# Patient Record
Sex: MALE | Race: OTHER | NOT HISPANIC OR LATINO | Employment: FULL TIME | URBAN - METROPOLITAN AREA
[De-identification: names, ages, dates, MRNs, and addresses within clinical notes are randomized per-mention and may not be internally consistent; named-entity substitution may affect disease eponyms.]

---

## 2024-10-15 ENCOUNTER — APPOINTMENT (OUTPATIENT)
Dept: URGENT CARE | Age: 39
End: 2024-10-15

## 2024-10-17 ENCOUNTER — OFFICE VISIT (OUTPATIENT)
Dept: URGENT CARE | Age: 39
End: 2024-10-17
Payer: COMMERCIAL

## 2024-10-17 VITALS
RESPIRATION RATE: 20 BRPM | OXYGEN SATURATION: 98 % | TEMPERATURE: 98.2 F | SYSTOLIC BLOOD PRESSURE: 112 MMHG | HEART RATE: 67 BPM | DIASTOLIC BLOOD PRESSURE: 75 MMHG

## 2024-10-17 DIAGNOSIS — J01.90 ACUTE SINUSITIS, RECURRENCE NOT SPECIFIED, UNSPECIFIED LOCATION: Primary | ICD-10-CM

## 2024-10-17 DIAGNOSIS — R09.81 CONGESTION OF NASAL SINUS: ICD-10-CM

## 2024-10-17 DIAGNOSIS — R05.1 ACUTE COUGH: ICD-10-CM

## 2024-10-17 LAB
POC RAPID INFLUENZA A: NEGATIVE
POC RAPID INFLUENZA B: NEGATIVE
POC SARS-COV-2 AG BINAX: NORMAL

## 2024-10-17 RX ORDER — BROMPHENIRAMINE MALEATE, PSEUDOEPHEDRINE HYDROCHLORIDE, AND DEXTROMETHORPHAN HYDROBROMIDE 2; 30; 10 MG/5ML; MG/5ML; MG/5ML
10 SYRUP ORAL 4 TIMES DAILY PRN
Qty: 120 ML | Refills: 0 | Status: SHIPPED | OUTPATIENT
Start: 2024-10-17 | End: 2024-10-27

## 2024-10-17 RX ORDER — AMOXICILLIN AND CLAVULANATE POTASSIUM 875; 125 MG/1; MG/1
1 TABLET, FILM COATED ORAL 2 TIMES DAILY
Qty: 20 TABLET | Refills: 0 | Status: SHIPPED | OUTPATIENT
Start: 2024-10-17 | End: 2024-10-27

## 2024-10-17 ASSESSMENT — ENCOUNTER SYMPTOMS
RHINORRHEA: 1
FEVER: 0
MYALGIAS: 0
HEADACHES: 0
SORE THROAT: 1
SWEATS: 0
SHORTNESS OF BREATH: 0
CHILLS: 0
HEMOPTYSIS: 0
WHEEZING: 0
COUGH: 1
WEIGHT LOSS: 0
HEARTBURN: 0

## 2024-10-17 NOTE — PROGRESS NOTES
Subjective   Patient ID: Terence Walters is a 39 y.o. male. They present today with a chief complaint of Cough and Nasal Congestion (Sinus and chest congestion with cough).    History of Present Illness    History provided by:  Patient   used: No    Cough  This is a new problem. Episode onset: 5 days. The problem has been gradually worsening. The problem occurs hourly. The cough is Productive of purulent sputum. Associated symptoms include nasal congestion, postnasal drip, rhinorrhea and a sore throat. Pertinent negatives include no chest pain, chills, ear congestion, ear pain, fever, headaches, heartburn, hemoptysis, myalgias, rash, shortness of breath, sweats, weight loss or wheezing. Associated symptoms comments: Sore throat rated 7/10. Nothing aggravates the symptoms. He has tried nothing for the symptoms. The treatment provided no relief.       Past Medical History  Allergies as of 10/17/2024    (No Known Allergies)       (Not in a hospital admission)       No past medical history on file.    Past Surgical History:   Procedure Laterality Date    OTHER SURGICAL HISTORY  07/26/2022    No history of surgery            Review of Systems  Review of Systems   Constitutional:  Negative for chills, fever and weight loss.   HENT:  Positive for postnasal drip, rhinorrhea and sore throat. Negative for ear pain.    Respiratory:  Positive for cough. Negative for hemoptysis, shortness of breath and wheezing.    Cardiovascular:  Negative for chest pain.   Gastrointestinal:  Negative for heartburn.   Musculoskeletal:  Negative for myalgias.   Skin:  Negative for rash.   Neurological:  Negative for headaches.     Objective    Vitals:    10/17/24 1115   BP: 112/75   Pulse: 67   Resp: 20   Temp: 36.8 °C (98.2 °F)   SpO2: 98%     No LMP for male patient.    Physical Exam    Procedures    Point of Care Test & Imaging Results from this visit  Results for orders placed or performed in visit on 10/17/24   POCT  Covid-19 Rapid Antigen   Result Value Ref Range    POC MAY-COV-2 AG  Presumptive negative test for SARS-CoV-2 (no antigen detected)     Presumptive negative test for SARS-CoV-2 (no antigen detected)   POCT Influenza A/B manually resulted   Result Value Ref Range    POC Rapid Influenza A Negative Negative    POC Rapid Influenza B Negative Negative      No results found.    Diagnostic study results (if any) were reviewed by RUFINO Chi.    Assessment/Plan   Allergies, medications, history, and pertinent labs/EKGs/Imaging reviewed by RUFINO Chi.     Medical Decision Making  Take the antibiotic with food.  Eat yogurt or take probiotic once a day.  Symptoms should improve in 2 to 3 days.   Wash your hands often, especially after coughing or touching your nose or mouth.  Use disposable tissues instead of handkerchiefs.  Increase fluid intake and rest as needed.  Take Tylenol and/or ibuprofen as needed for aches and pain and/or fever.  Return or follow-up with primary care provider if symptoms did not improve.  Call 911 or go to the nearest emergency room if symptoms became severe such as fever of 102.5 degrees Fahrenheit or 39.2 degrees Celsius, severe pain, shortness of breath, chest tightness.   Patient verbalized understanding of the instructions and left in stable condition.      Orders and Diagnoses  Diagnoses and all orders for this visit:  Acute sinusitis, recurrence not specified, unspecified location  -     POCT Covid-19 Rapid Antigen  -     POCT Influenza A/B manually resulted  -     amoxicillin-pot clavulanate (Augmentin) 875-125 mg tablet; Take 1 tablet by mouth 2 times a day for 10 days.  -     brompheniramine-pseudoeph-DM 2-30-10 mg/5 mL syrup; Take 10 mL by mouth 4 times a day as needed for allergies, congestion or cough for up to 10 days.  Congestion of nasal sinus  -     POCT Covid-19 Rapid Antigen  -     POCT Influenza A/B manually resulted  -     amoxicillin-pot clavulanate  (Augmentin) 875-125 mg tablet; Take 1 tablet by mouth 2 times a day for 10 days.  -     brompheniramine-pseudoeph-DM 2-30-10 mg/5 mL syrup; Take 10 mL by mouth 4 times a day as needed for allergies, congestion or cough for up to 10 days.  Acute cough  -     POCT Covid-19 Rapid Antigen  -     POCT Influenza A/B manually resulted  -     amoxicillin-pot clavulanate (Augmentin) 875-125 mg tablet; Take 1 tablet by mouth 2 times a day for 10 days.  -     brompheniramine-pseudoeph-DM 2-30-10 mg/5 mL syrup; Take 10 mL by mouth 4 times a day as needed for allergies, congestion or cough for up to 10 days.      Medical Admin Record      Patient disposition: Home    Electronically signed by RUFINO Chi  12:11 PM

## 2024-11-21 ENCOUNTER — OFFICE VISIT (OUTPATIENT)
Dept: URGENT CARE | Age: 39
End: 2024-11-21
Payer: COMMERCIAL

## 2024-11-21 VITALS
DIASTOLIC BLOOD PRESSURE: 71 MMHG | RESPIRATION RATE: 18 BRPM | OXYGEN SATURATION: 98 % | HEART RATE: 60 BPM | TEMPERATURE: 97 F | SYSTOLIC BLOOD PRESSURE: 118 MMHG

## 2024-11-21 DIAGNOSIS — H61.22 IMPACTED CERUMEN OF LEFT EAR: Primary | ICD-10-CM

## 2024-11-21 ASSESSMENT — ENCOUNTER SYMPTOMS
CARDIOVASCULAR NEGATIVE: 1
RESPIRATORY NEGATIVE: 1
CONSTITUTIONAL NEGATIVE: 1

## 2024-11-21 NOTE — PROGRESS NOTES
Subjective   Patient ID: Terence Walters is a 39 y.o. male. They present today with a chief complaint of Earache (BL ear complaint).    History of Present Illness  39-year-old male presents to clinic today with complaints of bilateral ear itching.  Patient states that this originally started with the left 1 about 4 to 5 days ago.  It is now somewhat the right.  He denies true pain.  He states that there was a smell to the left ear.  Denies discharge.  Denies trauma to the ear.      Earache         Past Medical History  Allergies as of 11/21/2024    (No Known Allergies)       (Not in a hospital admission)       History reviewed. No pertinent past medical history.    Past Surgical History:   Procedure Laterality Date    OTHER SURGICAL HISTORY  07/26/2022    No history of surgery            Review of Systems  Review of Systems   Constitutional: Negative.    HENT:  Positive for ear pain.    Respiratory: Negative.     Cardiovascular: Negative.                                   Objective    Vitals:    11/21/24 1026   BP: 118/71   Pulse: 60   Resp: 18   Temp: 36.1 °C (97 °F)   TempSrc: Temporal   SpO2: 98%     No LMP for male patient.    Physical Exam  Constitutional:       Appearance: Normal appearance.   HENT:      Right Ear: Tympanic membrane, ear canal and external ear normal.      Left Ear: Tympanic membrane and external ear normal. There is impacted cerumen.   Neurological:      Mental Status: He is alert.         Ear Cerumen Removal    Date/Time: 11/21/2024 11:00 AM    Performed by: Huber Rosado PA-C  Authorized by: Huber Rosado PA-C    Consent:     Consent obtained:  Verbal    Consent given by:  Patient    Risks, benefits, and alternatives were discussed: yes      Risks discussed:  Bleeding, infection, TM perforation, pain, incomplete removal and dizziness    Alternatives discussed:  No treatment  Universal protocol:     Patient identity confirmed:  Verbally with patient  Procedure details:     Location:   L ear    Procedure type: irrigation      Procedure outcomes: cerumen removed    Post-procedure details:     Inspection:  No bleeding and TM intact    Hearing quality:  Improved    Procedure completion:  Tolerated      Point of Care Test & Imaging Results from this visit  No results found for this visit on 11/21/24.   No results found.    Diagnostic study results (if any) were reviewed by Huber Rosado PA-C.    Assessment/Plan   Allergies, medications, history, and pertinent labs/EKGs/Imaging reviewed by Huber Rosado PA-C.     Medical Decision Making  On examination there look to be possible cerumen surrounding the canal.  I was able to be irrigated out and patient states his symptoms improved.  There is no other abnormality noted to TMs or canals.    Orders and Diagnoses  There are no diagnoses linked to this encounter.    Medical Admin Record      Patient disposition: Home    Electronically signed by Huber Rosado PA-C  10:31 AM

## 2025-01-11 ENCOUNTER — OFFICE VISIT (OUTPATIENT)
Dept: URGENT CARE | Age: 40
End: 2025-01-11
Payer: COMMERCIAL

## 2025-01-11 VITALS
SYSTOLIC BLOOD PRESSURE: 120 MMHG | HEART RATE: 84 BPM | DIASTOLIC BLOOD PRESSURE: 77 MMHG | TEMPERATURE: 97.3 F | OXYGEN SATURATION: 98 %

## 2025-01-11 DIAGNOSIS — H66.001 ACUTE SUPPURATIVE OTITIS MEDIA OF RIGHT EAR WITHOUT SPONTANEOUS RUPTURE OF TYMPANIC MEMBRANE, RECURRENCE NOT SPECIFIED: Primary | ICD-10-CM

## 2025-01-11 DIAGNOSIS — H10.9 BACTERIAL CONJUNCTIVITIS: ICD-10-CM

## 2025-01-11 PROCEDURE — 99213 OFFICE O/P EST LOW 20 MIN: CPT | Performed by: NURSE PRACTITIONER

## 2025-01-11 RX ORDER — AZITHROMYCIN 250 MG/1
TABLET, FILM COATED ORAL
Qty: 6 TABLET | Refills: 0 | Status: SHIPPED | OUTPATIENT
Start: 2025-01-11

## 2025-01-11 RX ORDER — TOBRAMYCIN 3 MG/ML
2 SOLUTION/ DROPS OPHTHALMIC EVERY 4 HOURS
Qty: 10 ML | Refills: 0 | Status: SHIPPED | OUTPATIENT
Start: 2025-01-11 | End: 2025-01-25

## 2025-01-11 ASSESSMENT — ENCOUNTER SYMPTOMS
EYE PAIN: 1
COUGH: 0
EYE DISCHARGE: 1
NAUSEA: 0
ABDOMINAL PAIN: 0
FATIGUE: 0
HEADACHES: 0
PHOTOPHOBIA: 0
EYE ITCHING: 0
EYE REDNESS: 1
WHEEZING: 0
DIARRHEA: 0
MYALGIAS: 0
LOSS OF CONSCIOUSNESS: 0
SHORTNESS OF BREATH: 0
VOMITING: 0
SORE THROAT: 0
RHINORRHEA: 0
FEVER: 0

## 2025-01-11 ASSESSMENT — VISUAL ACUITY: OU: 1

## 2025-01-11 NOTE — PROGRESS NOTES
Subjective   Patient ID: Terence Walters is a 39 y.o. male. They present today with a chief complaint of eye issue (Discharge, redness, swelling. Pt states it is on lt side more than rt. ).    History of Present Illness    History provided by:  Patient   used: No    Conjunctivitis  Location:  Both eyes with green discharge and redness, left worse thant right  Quality:  Denied pain  Severity:  Mild  Onset quality:  Sudden  Duration:  1 day  Timing:  Constant  Progression:  Worsening  Chronicity:  New  Relieved by:  Nothing tried  Worsened by:  Nothing  Ineffective treatments:  Nothing tried  Associated symptoms: no abdominal pain, no chest pain, no congestion, no cough, no diarrhea, no ear pain, no fatigue, no fever, no headaches, no loss of consciousness, no myalgias, no nausea, no rash, no rhinorrhea, no shortness of breath, no sore throat, no vomiting and no wheezing    Associated symptoms comment:  Occasional blurred vision. Pt denied eye pain and itchiness.      Past Medical History  Allergies as of 01/11/2025    (No Known Allergies)       (Not in a hospital admission)       History reviewed. No pertinent past medical history.    Past Surgical History:   Procedure Laterality Date    OTHER SURGICAL HISTORY  07/26/2022    No history of surgery            Review of Systems  Review of Systems   Constitutional:  Negative for fatigue and fever.   HENT:  Negative for congestion, ear pain, rhinorrhea and sore throat.    Eyes:  Positive for pain, discharge, redness and visual disturbance. Negative for photophobia and itching.   Respiratory:  Negative for cough, shortness of breath and wheezing.    Cardiovascular:  Negative for chest pain.   Gastrointestinal:  Negative for abdominal pain, diarrhea, nausea and vomiting.   Musculoskeletal:  Negative for myalgias.   Skin:  Negative for rash.   Neurological:  Negative for loss of consciousness and headaches.                                  Objective     Vitals:    01/11/25 1308   BP: 120/77   Pulse: 84   Temp: 36.3 °C (97.3 °F)   SpO2: 98%     No LMP for male patient.    Physical Exam  Vitals and nursing note reviewed.   Constitutional:       Appearance: Normal appearance.   HENT:      Head: Normocephalic and atraumatic.      Right Ear: Hearing, ear canal and external ear normal. Tympanic membrane is erythematous.      Left Ear: Hearing, tympanic membrane, ear canal and external ear normal.      Nose: Mucosal edema, congestion and rhinorrhea present. No nasal deformity, septal deviation, signs of injury, laceration or nasal tenderness. Rhinorrhea is clear.      Right Sinus: No maxillary sinus tenderness or frontal sinus tenderness.      Left Sinus: No maxillary sinus tenderness or frontal sinus tenderness.      Mouth/Throat:      Lips: Pink.      Mouth: Mucous membranes are moist.      Pharynx: Uvula midline.      Tonsils: No tonsillar exudate or tonsillar abscesses.   Eyes:      General: Lids are normal. Lids are everted, no foreign bodies appreciated. Vision grossly intact. Gaze aligned appropriately.         Right eye: Discharge present. No foreign body or hordeolum.         Left eye: No foreign body, discharge or hordeolum.      Extraocular Movements:      Right eye: Normal extraocular motion and no nystagmus.      Left eye: Normal extraocular motion and no nystagmus.      Conjunctiva/sclera:      Right eye: Right conjunctiva is injected. No chemosis, exudate or hemorrhage.     Left eye: Left conjunctiva is injected. No chemosis, exudate or hemorrhage.     Comments: Visual acuity 20/25 OD, 20/25 OS and 20/25 OU without correction     Cardiovascular:      Rate and Rhythm: Normal rate and regular rhythm.      Heart sounds: Normal heart sounds.   Pulmonary:      Effort: Pulmonary effort is normal.      Breath sounds: Normal breath sounds and air entry.   Musculoskeletal:      Cervical back: Normal range of motion and neck supple.   Lymphadenopathy:       Cervical: No cervical adenopathy.   Neurological:      Mental Status: He is alert.   Psychiatric:         Mood and Affect: Mood normal.         Behavior: Behavior normal.         Procedures    Point of Care Test & Imaging Results from this visit  No results found for this visit on 01/11/25.   No results found.    Diagnostic study results (if any) were reviewed by RUFINO Chi.    Assessment/Plan   Allergies, medications, history, and pertinent labs/EKGs/Imaging reviewed by RUFINO Chi.     Medical Decision Making  Use the antibiotic eyedrops as instructed. Symptoms should improve in 2 to 3 days.  Take Zpack per instructions for right OM.  Take Tylenol and/or ibuprofen as needed for aches and pain and/or fever.  Follow with your eye care specialist ASAP.  Call 911 or go to the nearest emergency room if symptoms became severe such as severe eye pain, trouble seeing, fever of 102.5 degrees Fahrenheit or 39.2 degrees celsius.  Patient verbalized understanding of the instructions and left in stable condition.      Orders and Diagnoses  Diagnoses and all orders for this visit:  Acute suppurative otitis media of right ear without spontaneous rupture of tympanic membrane, recurrence not specified  -     azithromycin (Zithromax) 250 mg tablet; Take 2 tabs (500 mg) by mouth today, than 1 daily for 4 days.  Bacterial conjunctivitis  -     tobramycin (Tobrex) 0.3 % ophthalmic solution; Administer 2 drops into both eyes every 4 hours for 14 days.      Medical Admin Record      Patient disposition: Home    Electronically signed by RUFINO Chi  1:35 PM

## 2025-01-27 ENCOUNTER — OFFICE VISIT (OUTPATIENT)
Dept: URGENT CARE | Age: 40
End: 2025-01-27
Payer: COMMERCIAL

## 2025-01-27 VITALS
DIASTOLIC BLOOD PRESSURE: 74 MMHG | RESPIRATION RATE: 20 BRPM | HEART RATE: 84 BPM | OXYGEN SATURATION: 98 % | SYSTOLIC BLOOD PRESSURE: 131 MMHG | TEMPERATURE: 97.4 F

## 2025-01-27 DIAGNOSIS — J02.9 SORE THROAT: ICD-10-CM

## 2025-01-27 DIAGNOSIS — J06.9 VIRAL URI: Primary | ICD-10-CM

## 2025-01-27 LAB — POC RAPID STREP: NEGATIVE

## 2025-01-27 PROCEDURE — 87880 STREP A ASSAY W/OPTIC: CPT | Performed by: PHYSICIAN ASSISTANT

## 2025-01-27 PROCEDURE — 99213 OFFICE O/P EST LOW 20 MIN: CPT | Performed by: PHYSICIAN ASSISTANT

## 2025-01-27 RX ORDER — BROMPHENIRAMINE MALEATE, PSEUDOEPHEDRINE HYDROCHLORIDE, AND DEXTROMETHORPHAN HYDROBROMIDE 2; 30; 10 MG/5ML; MG/5ML; MG/5ML
5 SYRUP ORAL 4 TIMES DAILY PRN
Qty: 120 ML | Refills: 0 | Status: SHIPPED | OUTPATIENT
Start: 2025-01-27 | End: 2025-02-06

## 2025-01-27 ASSESSMENT — ENCOUNTER SYMPTOMS
COUGH: 1
CHILLS: 0
FEVER: 0
WHEEZING: 0
RHINORRHEA: 1
SHORTNESS OF BREATH: 0
CHEST TIGHTNESS: 0
SORE THROAT: 1

## 2025-01-27 NOTE — PROGRESS NOTES
Subjective   Patient ID: Terence Walters is a 39 y.o. male. They present today with a chief complaint of Sore Throat and Nasal Congestion (Symptoms for a week).    History of Present Illness  Patient presents for sore throat that has been present for one week. He also states he has had slight nasal congestion. He feels a lot of pain when he swallows. He has had a slight cough as well. He denies any fevers, chills, ear pain, chest tightness, wheezing, shortness of breath. Patient was seen on January 11th for right otitis media completed azithromycin and notes that his symptoms resolved.      Sore Throat   Associated symptoms include congestion and coughing. Pertinent negatives include no ear discharge, ear pain or shortness of breath.       Past Medical History  Allergies as of 01/27/2025    (No Known Allergies)       (Not in a hospital admission)       No past medical history on file.    Past Surgical History:   Procedure Laterality Date    OTHER SURGICAL HISTORY  07/26/2022    No history of surgery            Review of Systems  Review of Systems   Constitutional:  Negative for chills and fever.   HENT:  Positive for congestion, rhinorrhea and sore throat. Negative for ear discharge and ear pain.    Respiratory:  Positive for cough. Negative for chest tightness, shortness of breath and wheezing.                                   Objective    Vitals:    01/27/25 1131   BP: 131/74   Pulse: 84   Resp: 20   Temp: 36.3 °C (97.4 °F)   SpO2: 98%     No LMP for male patient.    Physical Exam  Vitals reviewed.   Constitutional:       General: He is not in acute distress.  HENT:      Right Ear: Tympanic membrane and ear canal normal.      Left Ear: Tympanic membrane and ear canal normal.      Nose: No congestion or rhinorrhea.      Mouth/Throat:      Mouth: Mucous membranes are moist.      Pharynx: Posterior oropharyngeal erythema and postnasal drip present. No oropharyngeal exudate.      Tonsils: No tonsillar exudate.    Cardiovascular:      Rate and Rhythm: Normal rate and regular rhythm.      Heart sounds: Normal heart sounds.   Pulmonary:      Effort: Pulmonary effort is normal. No respiratory distress.      Breath sounds: Normal breath sounds. No wheezing, rhonchi or rales.         Procedures    Point of Care Test & Imaging Results from this visit  Results for orders placed or performed in visit on 01/27/25   POCT rapid strep A manually resulted   Result Value Ref Range    POC Rapid Strep Negative Negative      No results found.    Diagnostic study results (if any) were reviewed by Ladonna Alvarado PA-C.    Assessment/Plan   Allergies, medications, history, and pertinent labs/EKGs/Imaging reviewed by Ladonna Alvarado PA-C.     Medical Decision Making  MDM: History and examination consistent with viral URI. Rapid strep is negative.  Discussed there are no signs of pneumonia, sinusitis, otitis or other bacterial etiology. Plan at this time is supportive measures and symptomatic care at home. Advised to go to ER if worsens, otherwise follow with pcp. Patient verbalized understanding and agrees with plan.      Orders and Diagnoses  Diagnoses and all orders for this visit:  Viral URI  -     brompheniramine-pseudoeph-DM 2-30-10 mg/5 mL syrup; Take 5 mL by mouth 4 times a day as needed for allergies for up to 10 days.  Sore throat  -     POCT rapid strep A manually resulted      Medical Admin Record      Patient disposition: Home    Electronically signed by Ladonna Alvarado PA-C  11:53 AM

## 2025-01-27 NOTE — LETTER
January 27, 2025     Patient: Terence Walters   YOB: 1985   Date of Visit: 1/27/2025       To Whom It May Concern:    Terence Walters was seen in my clinic on 1/27/2025 at 11:40 am. Please excuse Terence for his absence from school on this day.    If you have any questions or concerns, please don't hesitate to call.         Sincerely,         Ladonna Alvarado PA-C

## 2025-02-07 ENCOUNTER — HOSPITAL ENCOUNTER (EMERGENCY)
Facility: HOSPITAL | Age: 40
Discharge: HOME | End: 2025-02-07
Attending: EMERGENCY MEDICINE
Payer: COMMERCIAL

## 2025-02-07 VITALS
SYSTOLIC BLOOD PRESSURE: 137 MMHG | HEART RATE: 95 BPM | RESPIRATION RATE: 20 BRPM | BODY MASS INDEX: 23.84 KG/M2 | DIASTOLIC BLOOD PRESSURE: 71 MMHG | OXYGEN SATURATION: 99 % | HEIGHT: 69 IN | WEIGHT: 160.94 LBS | TEMPERATURE: 98.4 F

## 2025-02-07 DIAGNOSIS — U07.1 COVID-19: Primary | ICD-10-CM

## 2025-02-07 DIAGNOSIS — R53.81 MALAISE: ICD-10-CM

## 2025-02-07 DIAGNOSIS — R50.9 FEVER, UNSPECIFIED FEVER CAUSE: ICD-10-CM

## 2025-02-07 LAB
BASOPHILS # BLD AUTO: 0.04 X10*3/UL (ref 0–0.1)
BASOPHILS NFR BLD AUTO: 0.6 %
EOSINOPHIL # BLD AUTO: 0.09 X10*3/UL (ref 0–0.7)
EOSINOPHIL NFR BLD AUTO: 1.4 %
ERYTHROCYTE [DISTWIDTH] IN BLOOD BY AUTOMATED COUNT: 11.5 % (ref 11.5–14.5)
FLUAV RNA RESP QL NAA+PROBE: NOT DETECTED
FLUBV RNA RESP QL NAA+PROBE: NOT DETECTED
HCT VFR BLD AUTO: 43 % (ref 41–52)
HGB BLD-MCNC: 14.4 G/DL (ref 13.5–17.5)
IMM GRANULOCYTES # BLD AUTO: 0.03 X10*3/UL (ref 0–0.7)
IMM GRANULOCYTES NFR BLD AUTO: 0.5 % (ref 0–0.9)
LYMPHOCYTES # BLD AUTO: 0.75 X10*3/UL (ref 1.2–4.8)
LYMPHOCYTES NFR BLD AUTO: 12.1 %
MCH RBC QN AUTO: 27.9 PG (ref 26–34)
MCHC RBC AUTO-ENTMCNC: 33.5 G/DL (ref 32–36)
MCV RBC AUTO: 83 FL (ref 80–100)
MONOCYTES # BLD AUTO: 0.75 X10*3/UL (ref 0.1–1)
MONOCYTES NFR BLD AUTO: 12.1 %
NEUTROPHILS # BLD AUTO: 4.56 X10*3/UL (ref 1.2–7.7)
NEUTROPHILS NFR BLD AUTO: 73.3 %
NRBC BLD-RTO: 0 /100 WBCS (ref 0–0)
PLATELET # BLD AUTO: 180 X10*3/UL (ref 150–450)
RBC # BLD AUTO: 5.16 X10*6/UL (ref 4.5–5.9)
SARS-COV-2 RNA RESP QL NAA+PROBE: DETECTED
WBC # BLD AUTO: 6.2 X10*3/UL (ref 4.4–11.3)

## 2025-02-07 PROCEDURE — 87636 SARSCOV2 & INF A&B AMP PRB: CPT | Performed by: EMERGENCY MEDICINE

## 2025-02-07 PROCEDURE — 36415 COLL VENOUS BLD VENIPUNCTURE: CPT | Performed by: EMERGENCY MEDICINE

## 2025-02-07 PROCEDURE — 96374 THER/PROPH/DIAG INJ IV PUSH: CPT

## 2025-02-07 PROCEDURE — 85025 COMPLETE CBC W/AUTO DIFF WBC: CPT | Performed by: EMERGENCY MEDICINE

## 2025-02-07 PROCEDURE — 96361 HYDRATE IV INFUSION ADD-ON: CPT

## 2025-02-07 PROCEDURE — 96375 TX/PRO/DX INJ NEW DRUG ADDON: CPT

## 2025-02-07 PROCEDURE — 2500000004 HC RX 250 GENERAL PHARMACY W/ HCPCS (ALT 636 FOR OP/ED): Performed by: EMERGENCY MEDICINE

## 2025-02-07 PROCEDURE — 99284 EMERGENCY DEPT VISIT MOD MDM: CPT | Performed by: EMERGENCY MEDICINE

## 2025-02-07 RX ORDER — KETOROLAC TROMETHAMINE 30 MG/ML
15 INJECTION, SOLUTION INTRAMUSCULAR; INTRAVENOUS ONCE
Status: COMPLETED | OUTPATIENT
Start: 2025-02-07 | End: 2025-02-07

## 2025-02-07 RX ORDER — ONDANSETRON HYDROCHLORIDE 2 MG/ML
4 INJECTION, SOLUTION INTRAVENOUS ONCE
Status: COMPLETED | OUTPATIENT
Start: 2025-02-07 | End: 2025-02-07

## 2025-02-07 RX ADMIN — ONDANSETRON 4 MG: 2 INJECTION INTRAMUSCULAR; INTRAVENOUS at 04:15

## 2025-02-07 RX ADMIN — SODIUM CHLORIDE, POTASSIUM CHLORIDE, SODIUM LACTATE AND CALCIUM CHLORIDE 1000 ML: 600; 310; 30; 20 INJECTION, SOLUTION INTRAVENOUS at 04:13

## 2025-02-07 RX ADMIN — KETOROLAC TROMETHAMINE 15 MG: 30 INJECTION, SOLUTION INTRAMUSCULAR at 04:15

## 2025-02-07 ASSESSMENT — COLUMBIA-SUICIDE SEVERITY RATING SCALE - C-SSRS
1. IN THE PAST MONTH, HAVE YOU WISHED YOU WERE DEAD OR WISHED YOU COULD GO TO SLEEP AND NOT WAKE UP?: NO
2. HAVE YOU ACTUALLY HAD ANY THOUGHTS OF KILLING YOURSELF?: NO
6. HAVE YOU EVER DONE ANYTHING, STARTED TO DO ANYTHING, OR PREPARED TO DO ANYTHING TO END YOUR LIFE?: NO

## 2025-02-07 ASSESSMENT — PAIN - FUNCTIONAL ASSESSMENT: PAIN_FUNCTIONAL_ASSESSMENT: 0-10

## 2025-02-07 ASSESSMENT — PAIN DESCRIPTION - LOCATION: LOCATION: ABDOMEN

## 2025-02-07 ASSESSMENT — PAIN SCALES - GENERAL: PAINLEVEL_OUTOF10: 8

## 2025-02-07 ASSESSMENT — PAIN DESCRIPTION - PAIN TYPE: TYPE: ACUTE PAIN

## 2025-02-07 ASSESSMENT — PAIN DESCRIPTION - FREQUENCY: FREQUENCY: CONSTANT/CONTINUOUS

## 2025-02-07 ASSESSMENT — PAIN DESCRIPTION - DESCRIPTORS: DESCRIPTORS: CRAMPING

## 2025-02-07 NOTE — ED TRIAGE NOTES
Patient arrived to ED from home with c/o abd pain, fever, body aches, and overall not feeling well since yesterday. Patient denies chest pain, denies SOB. Patient stable at this time.

## 2025-02-07 NOTE — ED PROVIDER NOTES
Terence Walters is a 39 y.o. patient presenting to the ED for     Additional History Obtained from:   Limitations to History:  ------------------------------------------------------------------------------------------------------------------------------------------  Physical Exam:  Appearance: Alert, cooperative,   Skin: Warm, dry, appropriate color for ethnicity.  Eyes: Cornea clear. No scleral icterus or injection.   ENT: Mucous membranes moist.  Pulmonary: No accessory muscle use or stridor. Clear lung sounds bilaterally without rhonchi or wheezing.   Cardiac: Heart sounds regular without murmur. B/L radial pulses full and symmetric.   Abdomen: Soft, not tender.  No rebound or guarding.   Musculoskeletal: No gross deformities.   Neurological: Face symmetrical. Voice clear. Appropriately conversant.   Psychiatric: Appropriate mood and affect.    Medical Decision Making:  Chronic Medical Conditions Significantly Affecting Care:  has no past medical history on file.    Social Determinants of Health Significantly Affecting Care:    Differential Diagnosis Considered but not limited to:       External Records Reviewed:   I reviewed recent and relevant outside records including:     Independent Interpretation of Studies: The following studies were ordered as part of the emergency department work up and independently interpreted by me. See ED Course for details.    Diagnoses as of 02/07/25 0619   COVID-19   Malaise   Fever, unspecified fever cause         Escalation of Care:        Discussion of Management with Other Providers:   I discussed the patient/results with:     negative.    Patient was treated with IV fluids, Toradol, Zofran.  After this he feels much better and is requesting discharge.  His chemistry was hemolyzed and we collect ordered.  Given that the patient is feeling much better and is known to be COVID-positive I doubt this will change his overall management.  Follow-up and return precautions were discussed.  Additionally discussed supportive care at home.    Diagnoses as of 02/07/25 0619   COVID-19   Malaise   Fever, unspecified fever cause          Yanet Hirsch DO  02/11/25 1500

## 2025-02-07 NOTE — Clinical Note
Terence Walters was seen and treated in our emergency department on 2/7/2025.  He may return to school on 02/11/2025.  Wear a mask when you are in public. You may return to school and work when you are no longer having fevers and it has been at least 5 days since your symptoms started.     If you have any questions or concerns, please don't hesitate to call.      Yanet Hirsch, DO

## 2025-03-02 ENCOUNTER — OFFICE VISIT (OUTPATIENT)
Dept: URGENT CARE | Age: 40
End: 2025-03-02
Payer: COMMERCIAL

## 2025-03-02 VITALS
RESPIRATION RATE: 17 BRPM | TEMPERATURE: 98.2 F | OXYGEN SATURATION: 98 % | DIASTOLIC BLOOD PRESSURE: 86 MMHG | HEART RATE: 93 BPM | SYSTOLIC BLOOD PRESSURE: 123 MMHG

## 2025-03-02 DIAGNOSIS — R68.89 FLU-LIKE SYMPTOMS: ICD-10-CM

## 2025-03-02 DIAGNOSIS — Z20.822 SUSPECTED COVID-19 VIRUS INFECTION: ICD-10-CM

## 2025-03-02 DIAGNOSIS — J10.1 INFLUENZA A: Primary | ICD-10-CM

## 2025-03-02 LAB
POC RAPID INFLUENZA A: POSITIVE
POC RAPID INFLUENZA B: NEGATIVE
POC SARS-COV-2 AG BINAX: NORMAL

## 2025-03-02 PROCEDURE — 99213 OFFICE O/P EST LOW 20 MIN: CPT

## 2025-03-02 PROCEDURE — 1036F TOBACCO NON-USER: CPT

## 2025-03-02 PROCEDURE — 87811 SARS-COV-2 COVID19 W/OPTIC: CPT

## 2025-03-02 PROCEDURE — 87804 INFLUENZA ASSAY W/OPTIC: CPT

## 2025-03-02 RX ORDER — OSELTAMIVIR PHOSPHATE 75 MG/1
75 CAPSULE ORAL EVERY 12 HOURS
Qty: 10 CAPSULE | Refills: 0 | Status: SHIPPED | OUTPATIENT
Start: 2025-03-02 | End: 2025-03-07

## 2025-03-02 RX ORDER — FLUTICASONE PROPIONATE 50 MCG
2 SPRAY, SUSPENSION (ML) NASAL DAILY
Qty: 16 G | Refills: 0 | Status: SHIPPED | OUTPATIENT
Start: 2025-03-02 | End: 2026-03-02

## 2025-03-02 ASSESSMENT — ENCOUNTER SYMPTOMS
FATIGUE: 1
CARDIOVASCULAR NEGATIVE: 1
ACTIVITY CHANGE: 1
EYES NEGATIVE: 1
COUGH: 1
CHILLS: 1

## 2025-03-02 NOTE — PROGRESS NOTES
Subjective   Patient ID: Terence Walters is a 39 y.o. male. They present today with a chief complaint of Generalized Body Aches (Headache, fatigue, fever and cough for two days. History of covid  3 weeks ago. ).    History of Present Illness  David Walters is a 39 y.o. male. They present today with a chief complaint of Generalized Body Aches (Headache, fatigue, fever and cough for two days. History of covid  3 weeks ago. ).    Past Medical History  Allergies as of 03/02/2025    (No Known Allergies)       (Not in a hospital admission)       History reviewed. No pertinent past medical history.    Past Surgical History:   Procedure Laterality Date    OTHER SURGICAL HISTORY  07/26/2022    No history of surgery        reports that he has never smoked. He has never used smokeless tobacco.    Review of Systems  Review of Systems   Constitutional:  Positive for activity change, chills and fatigue.   HENT:  Positive for congestion.    Eyes: Negative.    Respiratory:  Positive for cough.    Cardiovascular: Negative.      Objective    Vitals:    03/02/25 1216   BP: 123/86   Pulse: 93   Resp: 17   Temp: 36.8 °C (98.2 °F)   TempSrc: Oral   SpO2: 98%     No LMP for male patient.    Physical Exam  Vitals and nursing note reviewed.   Constitutional:       Appearance: Normal appearance.   HENT:      Head: Normocephalic and atraumatic.      Right Ear: Tympanic membrane normal.      Left Ear: Tympanic membrane normal.      Nose: Nose normal.      Mouth/Throat:      Mouth: Mucous membranes are moist.      Pharynx: Oropharynx is clear.   Eyes:      Extraocular Movements: Extraocular movements intact.      Conjunctiva/sclera: Conjunctivae normal.      Pupils: Pupils are equal, round, and reactive to light.   Cardiovascular:      Rate and Rhythm: Normal rate and regular rhythm.   Pulmonary:      Effort: Pulmonary effort is normal.      Breath sounds: Normal breath sounds.   Abdominal:      General: Bowel sounds are normal.       Palpations: Abdomen is soft.   Musculoskeletal:         General: Normal range of motion.      Cervical back: Normal range of motion and neck supple.   Skin:     General: Skin is warm.      Capillary Refill: Capillary refill takes less than 2 seconds.   Neurological:      General: No focal deficit present.      Mental Status: He is alert and oriented to person, place, and time. Mental status is at baseline.   Psychiatric:         Mood and Affect: Mood normal.         Behavior: Behavior normal.         Thought Content: Thought content normal.         Judgment: Judgment normal.         Procedures    Point of Care Test & Imaging Results from this visit  Results for orders placed or performed in visit on 03/02/25   POCT Influenza A/B manually resulted   Result Value Ref Range    POC Rapid Influenza A Positive (A) Negative    POC Rapid Influenza B Negative Negative   POCT Covid-19 Rapid Antigen   Result Value Ref Range    POC MAY-COV-2 AG  Presumptive negative test for SARS-CoV-2 (no antigen detected)     Presumptive negative test for SARS-CoV-2 (no antigen detected)      No results found.    Diagnostic study results (if any) were reviewed by RUFINO Puckett.    Assessment/Plan   Allergies, medications, history, and pertinent labs/EKGs/Imaging reviewed by RUFINO Puckett.     Medical Decision Making  Positive for influenza A.  Tamiflu and Flonase sent.  Encouraged the patient to use Zyrtec, Tylenol and Motrin.  Follow-up with your primary care provider.    As a result of the work-up, the patient was discharged home.  he was informed of his diagnosis and instructed to come back with any concerns or worsening of condition.  he and was agreeable to the plan as discussed above.  he was given the opportunity to ask questions.  All of the patient's questions were answered.    This document was generated using the assistance of voice recognition software. If there are any errors of spelling, grammar,  syntax, or meaning; please feel free to contact me directly for clarification.     Orders and Diagnoses  Diagnoses and all orders for this visit:  Influenza A  -     oseltamivir (Tamiflu) 75 mg capsule; Take 1 capsule (75 mg) by mouth every 12 hours for 5 days.  -     fluticasone (Flonase) 50 mcg/actuation nasal spray; Administer 2 sprays into each nostril once daily. Shake gently. Before first use, prime pump. After use, clean tip and replace cap.  Suspected COVID-19 virus infection  -     POCT Covid-19 Rapid Antigen  -     oseltamivir (Tamiflu) 75 mg capsule; Take 1 capsule (75 mg) by mouth every 12 hours for 5 days.  -     fluticasone (Flonase) 50 mcg/actuation nasal spray; Administer 2 sprays into each nostril once daily. Shake gently. Before first use, prime pump. After use, clean tip and replace cap.  Flu-like symptoms  -     POCT Influenza A/B manually resulted  -     oseltamivir (Tamiflu) 75 mg capsule; Take 1 capsule (75 mg) by mouth every 12 hours for 5 days.  -     fluticasone (Flonase) 50 mcg/actuation nasal spray; Administer 2 sprays into each nostril once daily. Shake gently. Before first use, prime pump. After use, clean tip and replace cap.      Medical Admin Record      Patient disposition: Home    Electronically signed by RUFINO Puckett  12:33 PM

## 2025-03-02 NOTE — LETTER
March 2, 2025     Patient: Terence Walters   YOB: 1985   Date of Visit: 3/2/2025       To Whom It May Concern:    Terence Walters was seen in my clinic on 3/2/2025 . Please excuse Terence for his absence from work on this day to make the appointment.    May return to work 3/ 6/2025.    If you have any questions or concerns, please don't hesitate to call.         Sincerely,         Lucina De, MARTINEZ-CNP        CC: No Recipients

## 2025-03-20 ENCOUNTER — ANCILLARY PROCEDURE (OUTPATIENT)
Dept: URGENT CARE | Age: 40
End: 2025-03-20
Payer: COMMERCIAL

## 2025-03-20 ENCOUNTER — OFFICE VISIT (OUTPATIENT)
Dept: URGENT CARE | Age: 40
End: 2025-03-20
Payer: COMMERCIAL

## 2025-03-20 VITALS
HEART RATE: 77 BPM | TEMPERATURE: 98 F | RESPIRATION RATE: 16 BRPM | SYSTOLIC BLOOD PRESSURE: 117 MMHG | DIASTOLIC BLOOD PRESSURE: 79 MMHG | OXYGEN SATURATION: 99 %

## 2025-03-20 DIAGNOSIS — S96.911A STRAIN OF RIGHT ANKLE, INITIAL ENCOUNTER: Primary | ICD-10-CM

## 2025-03-20 DIAGNOSIS — M25.571 ACUTE RIGHT ANKLE PAIN: ICD-10-CM

## 2025-03-20 PROCEDURE — 73610 X-RAY EXAM OF ANKLE: CPT | Mod: RIGHT SIDE

## 2025-03-20 NOTE — PROGRESS NOTES
Subjective   Patient ID: Terence Walters is a 40 y.o. male. They present today with a chief complaint of Ankle Pain.    History of Present Illness  HPI a 40-year-old male arrives to the clinic with chief complaint of right ankle pain.  The patient reports that he was playing volleyball an hour prior to this encounter and states that when he jumped in the air he rolled his right ankle inward.  He noticed immediate pain, swelling and tenderness to his lateral malleolus of his right foot.  He is here for evaluation.    Past Medical History  Allergies as of 03/20/2025    (No Known Allergies)       (Not in a hospital admission)       History reviewed. No pertinent past medical history.    Past Surgical History:   Procedure Laterality Date    OTHER SURGICAL HISTORY  07/26/2022    No history of surgery        reports that he has never smoked. He has never used smokeless tobacco.    Review of Systems  Review of Systems  Joint pain, swelling, tenderness  Objective    Vitals:    03/20/25 1824   BP: 117/79   Pulse: 77   Resp: 16   Temp: 36.7 °C (98 °F)   SpO2: 99%     No LMP for male patient.    Physical Exam  Generalized swelling and erythema to the lateral malleoli of his right foot  Procedures    Point of Care Test & Imaging Results from this visit  No results found for this visit on 03/20/25.   XR ankle right 3+ views    Result Date: 3/20/2025  Interpreted By:  Dl Herndon, STUDY: XR ANKLE RIGHT 3+ VIEWS; ;  3/20/2025 6:43 pm   INDICATION: Signs/Symptoms:right lat mal pain, volleyball 1 hour ago, jumped and rolled ankle.   ,M25.571 Pain in right ankle and joints of right foot   COMPARISON: None.   ACCESSION NUMBER(S): [id number]   ORDERING CLINICIAN: MICHAEL HUTCHINS   FINDINGS: Three views of the right ankle are provided for interpretation.   Soft tissue swelling and edema overlying the lateral malleolus of the right ankle without evidence of fracture or traumatic malalignment. Ankle mortise is maintained.  Visualized joint spaces are unremarkable in appearance.       Soft tissue swelling and edema overlying the lateral malleolus of the right ankle evidence of fracture or traumatic malalignment.     MACRO: None   Signed by: Dl Herndon 3/20/2025 6:57 PM Dictation workstation:   [id number]     Diagnostic study results (if any) were reviewed by RUFINO Puckett.    Assessment/Plan   Allergies, medications, history, and pertinent labs/EKGs/Imaging reviewed by RUFINO Puckett.     Medical Decision Making  Upon initial assessment, the patient was sitting in mild distress with his right leg propped up.  There is obvious ecchymosis, swelling and bruising to the lateral malleolus of his right foot.  Active and passive range of motion is severely diminished.  The patient denies any numbness or tingling.  He is able to move his toes.  Neurovascularly intact.  Capillary fill is less than 3 seconds.  He is able to bear weight however does grimace while ambulating.  Given the recent trauma, an x-ray of his right ankle was ordered.    X-ray of right ankle: Soft tissue swelling and edema overlying the lateral malleolus of the right ankle without evidence of fracture or traumatic malalignment.  Ankle mortise is maintained. Visualized joint spaces are unremarkable  in appearance.    Given his symptoms, I recommend RICE techniques with Tylenol, ice and Motrin.  Follow-up with your primary care provider.  Ankle strain education was given in the office.    As a result of the work-up, the patient was discharged home.  he was informed of his diagnosis and instructed to come back with any concerns or worsening of condition.  he and was agreeable to the plan as discussed above.  he was given the opportunity to ask questions.  All of the patient's questions were answered.    This document was generated using the assistance of voice recognition software. If there are any errors of spelling, grammar, syntax, or  meaning; please feel free to contact me directly for clarification.     Orders and Diagnoses  Diagnoses and all orders for this visit:  Strain of right ankle, initial encounter  Acute right ankle pain  -     XR ankle right 3+ views; Future      Medical Admin Record      Patient disposition: Home    Electronically signed by RUFINO Puckett  7:08 PM

## 2025-04-25 ENCOUNTER — OFFICE VISIT (OUTPATIENT)
Dept: URGENT CARE | Age: 40
End: 2025-04-25
Payer: COMMERCIAL

## 2025-04-25 VITALS
SYSTOLIC BLOOD PRESSURE: 142 MMHG | HEART RATE: 81 BPM | RESPIRATION RATE: 18 BRPM | DIASTOLIC BLOOD PRESSURE: 81 MMHG | OXYGEN SATURATION: 98 % | TEMPERATURE: 97.8 F

## 2025-04-25 DIAGNOSIS — J01.90 ACUTE BACTERIAL SINUSITIS: Primary | ICD-10-CM

## 2025-04-25 DIAGNOSIS — J02.9 SORE THROAT: ICD-10-CM

## 2025-04-25 DIAGNOSIS — B96.89 ACUTE BACTERIAL SINUSITIS: Primary | ICD-10-CM

## 2025-04-25 LAB
POC HUMAN RHINOVIRUS PCR: NEGATIVE
POC INFLUENZA A VIRUS PCR: NEGATIVE
POC INFLUENZA B VIRUS PCR: NEGATIVE
POC RESPIRATORY SYNCYTIAL VIRUS PCR: NEGATIVE
POC STREPTOCOCCUS PYOGENES (GROUP A STREP) PCR: NEGATIVE

## 2025-04-25 NOTE — PROGRESS NOTES
Subjective   Patient ID: Terence Walters is a 40 y.o. male. They present today with a chief complaint of Cough (Complains of cough, congestion, sore throat for five days. ).    History of Present Illness  David Walters is a 40 y.o. male. They present today with a chief complaint of Cough (Complains of cough, congestion, sore throat for five days. ).  Reports that his daughter was diagnosed with strep.  He has not used any over-the-counter medications.  He is here for evaluation.    Past Medical History  Allergies as of 04/25/2025    (No Known Allergies)       Prescriptions Prior to Admission[1]     Medical History[2]    Surgical History[3]     reports that he has never smoked. He has never used smokeless tobacco.    Review of Systems  Review of Systems  Cough, congestion, sinus pressure, drainage    Objective    Vitals:    04/25/25 1145   BP: 142/81   Pulse: 81   Resp: 18   Temp: 36.6 °C (97.8 °F)   TempSrc: Oral   SpO2: 98%     No LMP for male patient.    Physical Exam  Mild congestion  Procedures    Point of Care Test & Imaging Results from this visit  No results found for this visit on 04/25/25.   Imaging  No results found.    Cardiology, Vascular, and Other Imaging  No other imaging results found for the past 2 days      Diagnostic study results (if any) were reviewed by RUFINO Puckett.    Assessment/Plan   Allergies, medications, history, and pertinent labs/EKGs/Imaging reviewed by RUFINO Puckett.     Medical Decision Making  Spot spider strep is negative.  Given the longevity of his symptoms, we will treat appropriately for bacterial sinusitis.  We were on downtime when the patient was in the office.  The patient was sent home with a paper prescription for Augmentin 1 tablet twice a day for 10 days.  Patient was instructed to use Flonase and Claritin.  Follow-up with your primary care provider.    As a result of the work-up, the patient was discharged home.  he was informed  of his diagnosis and instructed to come back with any concerns or worsening of condition.  he and was agreeable to the plan as discussed above.  he was given the opportunity to ask questions.  All of the patient's questions were answered.    This document was generated using the assistance of voice recognition software. If there are any errors of spelling, grammar, syntax, or meaning; please feel free to contact me directly for clarification.     Orders and Diagnoses  Diagnoses and all orders for this visit:  Acute bacterial sinusitis  Sore throat  -     POCT SPOTFIRE R/ST Panel Mini w/Strep A ([facility]) manually resulted      Medical Admin Record      Patient disposition: Home    Electronically signed by RUFINO Puckett  12:45 PM           [1] (Not in a hospital admission)   [2] History reviewed. No pertinent past medical history.  [3]   Past Surgical History:  Procedure Laterality Date    OTHER SURGICAL HISTORY  07/26/2022    No history of surgery